# Patient Record
Sex: MALE | Race: WHITE
[De-identification: names, ages, dates, MRNs, and addresses within clinical notes are randomized per-mention and may not be internally consistent; named-entity substitution may affect disease eponyms.]

---

## 2017-06-05 ENCOUNTER — HOSPITAL ENCOUNTER (EMERGENCY)
Dept: HOSPITAL 13 - EME | Age: 22
Discharge: HOME | End: 2017-06-05
Payer: COMMERCIAL

## 2017-06-05 VITALS — BODY MASS INDEX: 27.47 KG/M2 | WEIGHT: 207.23 LBS | HEIGHT: 73 IN

## 2017-06-05 VITALS — DIASTOLIC BLOOD PRESSURE: 81 MMHG | SYSTOLIC BLOOD PRESSURE: 125 MMHG
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
## 2017-06-05 DIAGNOSIS — M54.5: ICD-10-CM

## 2017-06-05 DIAGNOSIS — S80.01XA: Primary | ICD-10-CM

## 2017-09-29 ENCOUNTER — HOSPITAL ENCOUNTER (EMERGENCY)
Dept: HOSPITAL 13 - EME | Age: 22
Discharge: HOME | End: 2017-09-29
Payer: COMMERCIAL

## 2017-09-29 VITALS — WEIGHT: 208.34 LBS | BODY MASS INDEX: 27.61 KG/M2 | HEIGHT: 73 IN

## 2017-09-29 VITALS — SYSTOLIC BLOOD PRESSURE: 129 MMHG | DIASTOLIC BLOOD PRESSURE: 69 MMHG

## 2017-09-29 DIAGNOSIS — S76.112A: ICD-10-CM

## 2017-09-29 DIAGNOSIS — S83.92XA: Primary | ICD-10-CM
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
## 2017-09-29 DIAGNOSIS — Z88.8: ICD-10-CM

## 2017-11-26 ENCOUNTER — HOSPITAL ENCOUNTER (EMERGENCY)
Dept: HOSPITAL 13 - EME | Age: 22
Discharge: HOME | End: 2017-11-26
Payer: COMMERCIAL

## 2017-11-26 VITALS — HEIGHT: 73 IN | WEIGHT: 205.47 LBS | BODY MASS INDEX: 27.23 KG/M2

## 2017-11-26 VITALS — SYSTOLIC BLOOD PRESSURE: 136 MMHG | DIASTOLIC BLOOD PRESSURE: 97 MMHG

## 2017-11-26 DIAGNOSIS — F70: ICD-10-CM

## 2017-11-26 DIAGNOSIS — F90.2: ICD-10-CM

## 2017-11-26 DIAGNOSIS — Z88.8: ICD-10-CM

## 2017-11-26 DIAGNOSIS — F41.9: ICD-10-CM

## 2017-11-26 DIAGNOSIS — F31.32: Primary | ICD-10-CM

## 2017-11-26 LAB
AMPHET UR-MCNC: NEGATIVE NG/ML
ANION GAP: 12 MEQ/L (ref 2–14)
BARBITURATES UR-MCNC: NEGATIVE UG/ML
BENZODIAZ UR-MCNC: NEGATIVE UG/L
CHLORIDE: 105 MEQ/L (ref 99–109)
COCAINE UR-MCNC: NEGATIVE NG/ML
GFR SERPLBLD CREATININE-BSD FMLAMALE: > 59 ML/MIN/
GLUCOSE SERPL-MCNC: 106 MG/DL (ref 70–99)
HCO3 BLD-SCNC: 22 MEQ/L (ref 20–31)
HCT VFR BLD CALC: 43 % (ref 38–50)
INTERNAL CONTROLS VALID?: YES
MCH RBC QN AUTO: 30.9 PG (ref 29–34)
MCV: 87.4 FL (ref 86–99)
METHADONE SERPL-MCNC: NEGATIVE NG/ML
METHAMPHET UR-MCNC: NEGATIVE UG/ML
MONOCYTES # BLD MANUAL: 35.3 G/DL (ref 30–36)
NRBC (%): 0 /100 WBC (ref 0–0)
OPIATES (MORPHINE): NEGATIVE
OXYCODONE UR CFM-MCNC: NEGATIVE NG/ML
PCP SERPL-MCNC: NEGATIVE MG/DL
PLATELET COUNT: 236 K/UL (ref 156–360)
POTASSIUM SERPL-SCNC: 3.8 MEQ/L (ref 3.7–5.4)
PROPOXYPH UR-MCNC: NEGATIVE NG/ML
RBC # BLD AUTO: 4.92 M/UL (ref 4–5.5)
RBC DIS.WIDTH-CV: 11.8 % (ref 11.8–14.6)
RBC DIS.WIDTH-SD: 38 % (ref 39–53)
SERUM ETHYL ALCOHOL: < 10 MG/DL
SODIUM: 139 MEQ/L (ref 136–147)
THC CANNABINOIDS: NEGATIVE
TRICYCLICS SERPL-MCNC: (no result) UG/L
UREA NITROGEN (BUN): 6 MG/DL (ref 9–23)
VARIANT LYMPHS NFR BLD MANUAL: 12.1 UM^3 (ref 9–12.4)
WBC # BLD AUTO: 10.5 K/UL (ref 4.1–10.2)

## 2017-11-26 PROCEDURE — G0480 DRUG TEST DEF 1-7 CLASSES: HCPCS

## 2018-05-06 ENCOUNTER — HOSPITAL ENCOUNTER (EMERGENCY)
Dept: HOSPITAL 13 - EME | Age: 23
Discharge: HOME | End: 2018-05-06
Payer: COMMERCIAL

## 2018-05-06 VITALS — SYSTOLIC BLOOD PRESSURE: 156 MMHG | DIASTOLIC BLOOD PRESSURE: 99 MMHG

## 2018-05-06 VITALS — HEIGHT: 72 IN | BODY MASS INDEX: 29.23 KG/M2 | WEIGHT: 215.83 LBS

## 2018-05-06 DIAGNOSIS — F31.9: ICD-10-CM

## 2018-05-06 DIAGNOSIS — Z88.8: ICD-10-CM

## 2018-05-06 DIAGNOSIS — F90.9: ICD-10-CM

## 2018-05-06 DIAGNOSIS — M54.5: Primary | ICD-10-CM

## 2018-07-03 ENCOUNTER — HOSPITAL ENCOUNTER (EMERGENCY)
Dept: HOSPITAL 13 - EME | Age: 23
Discharge: HOME | End: 2018-07-03
Payer: COMMERCIAL

## 2018-07-03 VITALS — WEIGHT: 212.53 LBS | BODY MASS INDEX: 28.17 KG/M2 | HEIGHT: 73 IN

## 2018-07-03 VITALS — SYSTOLIC BLOOD PRESSURE: 150 MMHG | DIASTOLIC BLOOD PRESSURE: 92 MMHG

## 2018-07-03 DIAGNOSIS — F41.9: Primary | ICD-10-CM

## 2018-07-03 DIAGNOSIS — F90.9: ICD-10-CM

## 2018-07-03 PROCEDURE — G0480 DRUG TEST DEF 1-7 CLASSES: HCPCS

## 2020-01-15 ENCOUNTER — APPOINTMENT (OUTPATIENT)
Age: 25
Setting detail: DERMATOLOGY
End: 2020-01-15

## 2020-01-15 DIAGNOSIS — D22 MELANOCYTIC NEVI: ICD-10-CM

## 2020-01-15 DIAGNOSIS — L30.9 DERMATITIS, UNSPECIFIED: ICD-10-CM

## 2020-01-15 PROBLEM — D22.39 MELANOCYTIC NEVI OF OTHER PARTS OF FACE: Status: ACTIVE | Noted: 2020-01-15

## 2020-01-15 PROCEDURE — 99202 OFFICE O/P NEW SF 15 MIN: CPT

## 2020-01-15 PROCEDURE — OTHER ADDITIONAL NOTES: OTHER

## 2020-01-15 PROCEDURE — OTHER REASSURANCE: OTHER

## 2020-01-15 PROCEDURE — OTHER PRESCRIPTION MEDICATION MANAGEMENT: OTHER

## 2020-01-15 PROCEDURE — OTHER MIPS QUALITY: OTHER

## 2020-01-15 PROCEDURE — OTHER COUNSELING: OTHER

## 2020-01-15 PROCEDURE — OTHER PRESCRIPTION: OTHER

## 2020-01-15 RX ORDER — PIMECROLIMUS 10 MG/G
1% CREAM TOPICAL BID
Qty: 1 | Refills: 1 | Status: ERX | COMMUNITY
Start: 2020-01-15

## 2020-01-15 ASSESSMENT — LOCATION DETAILED DESCRIPTION DERM
LOCATION DETAILED: LEFT DORSAL SHAFT OF PENIS
LOCATION DETAILED: RIGHT INFERIOR FOREHEAD
LOCATION DETAILED: RIGHT DORSAL SHAFT OF PENIS
LOCATION DETAILED: RIGHT FOREHEAD

## 2020-01-15 ASSESSMENT — LOCATION ZONE DERM
LOCATION ZONE: PENIS
LOCATION ZONE: FACE
LOCATION ZONE: FACE
LOCATION ZONE: PENIS

## 2020-01-15 ASSESSMENT — LOCATION SIMPLE DESCRIPTION DERM
LOCATION SIMPLE: PENIS
LOCATION SIMPLE: PENIS
LOCATION SIMPLE: RIGHT FOREHEAD
LOCATION SIMPLE: RIGHT FOREHEAD

## 2020-01-15 NOTE — PROCEDURE: MIPS QUALITY
Quality 110: Preventive Care And Screening: Influenza Immunization: Influenza Immunization Administered during Influenza season
Quality 131: Pain Assessment And Follow-Up: Pain assessment documented as positive using a standardized tool AND a follow-up plan is documented
Quality 431: Preventive Care And Screening: Unhealthy Alcohol Use - Screening: Patient screened for unhealthy alcohol use using a single question and scores less than 2 times per year
Quality 226: Preventive Care And Screening: Tobacco Use: Screening And Cessation Intervention: Patient screened for tobacco use and is an ex/non-smoker
Detail Level: Detailed
Quality 111:Pneumonia Vaccination Status For Older Adults: Pneumococcal Vaccination not Administered or Previously Received, Reason not Otherwise Specified
Quality 402: Tobacco Use And Help With Quitting Among Adolescents: Patient screened for tobacco and is an ex-smoker
Quality 130: Documentation Of Current Medications In The Medical Record: Current Medications Documented

## 2020-01-15 NOTE — PROCEDURE: ADDITIONAL NOTES
Additional Notes: Patient denies any new sexual partners and denies any discharge. Rash is currently not present. Told patient if rash returns come back sooner.
Detail Level: Simple

## 2020-02-19 ENCOUNTER — APPOINTMENT (OUTPATIENT)
Age: 25
Setting detail: DERMATOLOGY
End: 2020-02-19

## 2020-02-19 DIAGNOSIS — L30.9 DERMATITIS, UNSPECIFIED: ICD-10-CM

## 2020-02-19 PROCEDURE — OTHER MIPS QUALITY: OTHER

## 2020-02-19 PROCEDURE — OTHER ADDITIONAL NOTES: OTHER

## 2020-02-19 PROCEDURE — 99024 POSTOP FOLLOW-UP VISIT: CPT

## 2020-02-19 ASSESSMENT — LOCATION SIMPLE DESCRIPTION DERM: LOCATION SIMPLE: PENIS

## 2020-02-19 ASSESSMENT — LOCATION ZONE DERM: LOCATION ZONE: PENIS

## 2020-02-19 ASSESSMENT — LOCATION DETAILED DESCRIPTION DERM: LOCATION DETAILED: RIGHT DORSAL SHAFT OF PENIS

## 2020-02-19 NOTE — PROCEDURE: MIPS QUALITY
Detail Level: Detailed
Quality 131: Pain Assessment And Follow-Up: Pain assessment documented as positive using a standardized tool AND a follow-up plan is documented
Quality 111:Pneumonia Vaccination Status For Older Adults: Pneumococcal Vaccination not Administered or Previously Received, Reason not Otherwise Specified
Quality 130: Documentation Of Current Medications In The Medical Record: Current Medications Documented
Quality 402: Tobacco Use And Help With Quitting Among Adolescents: Patient screened for tobacco and is an ex-smoker
Quality 110: Preventive Care And Screening: Influenza Immunization: Influenza Immunization Administered during Influenza season
Quality 226: Preventive Care And Screening: Tobacco Use: Screening And Cessation Intervention: Patient screened for tobacco use and is an ex/non-smoker
Quality 431: Preventive Care And Screening: Unhealthy Alcohol Use - Screening: Patient screened for unhealthy alcohol use using a single question and scores less than 2 times per year